# Patient Record
Sex: MALE | Race: WHITE | NOT HISPANIC OR LATINO
[De-identification: names, ages, dates, MRNs, and addresses within clinical notes are randomized per-mention and may not be internally consistent; named-entity substitution may affect disease eponyms.]

---

## 2017-04-03 ENCOUNTER — APPOINTMENT (OUTPATIENT)
Dept: ORTHOPEDIC SURGERY | Facility: CLINIC | Age: 22
End: 2017-04-03

## 2017-04-03 VITALS
SYSTOLIC BLOOD PRESSURE: 108 MMHG | DIASTOLIC BLOOD PRESSURE: 72 MMHG | HEIGHT: 76 IN | HEART RATE: 69 BPM | BODY MASS INDEX: 28.01 KG/M2 | WEIGHT: 230 LBS

## 2017-04-06 ENCOUNTER — FORM ENCOUNTER (OUTPATIENT)
Age: 22
End: 2017-04-06

## 2017-04-07 ENCOUNTER — OUTPATIENT (OUTPATIENT)
Dept: OUTPATIENT SERVICES | Facility: HOSPITAL | Age: 22
LOS: 1 days | End: 2017-04-07
Payer: SELF-PAY

## 2017-04-07 ENCOUNTER — APPOINTMENT (OUTPATIENT)
Dept: MRI IMAGING | Facility: CLINIC | Age: 22
End: 2017-04-07

## 2017-04-07 DIAGNOSIS — Z00.8 ENCOUNTER FOR OTHER GENERAL EXAMINATION: ICD-10-CM

## 2017-04-07 DIAGNOSIS — M25.512 PAIN IN LEFT SHOULDER: ICD-10-CM

## 2017-04-07 PROCEDURE — 73221 MRI JOINT UPR EXTREM W/O DYE: CPT

## 2017-04-07 PROCEDURE — 73221 MRI JOINT UPR EXTREM W/O DYE: CPT | Mod: 26,LT

## 2017-04-12 ENCOUNTER — APPOINTMENT (OUTPATIENT)
Dept: ORTHOPEDIC SURGERY | Facility: CLINIC | Age: 22
End: 2017-04-12
Payer: OTHER GOVERNMENT

## 2017-04-12 VITALS
HEIGHT: 76 IN | SYSTOLIC BLOOD PRESSURE: 118 MMHG | HEART RATE: 66 BPM | WEIGHT: 235 LBS | DIASTOLIC BLOOD PRESSURE: 80 MMHG | BODY MASS INDEX: 28.62 KG/M2

## 2017-04-12 DIAGNOSIS — M25.512 PAIN IN LEFT SHOULDER: ICD-10-CM

## 2017-04-12 PROCEDURE — 99213 OFFICE O/P EST LOW 20 MIN: CPT

## 2017-04-19 ENCOUNTER — EMERGENCY (EMERGENCY)
Facility: HOSPITAL | Age: 22
LOS: 1 days | Discharge: HOME | End: 2017-04-19
Attending: PEDIATRICS | Admitting: EMERGENCY MEDICINE
Payer: COMMERCIAL

## 2017-04-19 VITALS
RESPIRATION RATE: 20 BRPM | OXYGEN SATURATION: 98 % | SYSTOLIC BLOOD PRESSURE: 120 MMHG | HEART RATE: 72 BPM | DIASTOLIC BLOOD PRESSURE: 90 MMHG

## 2017-04-19 DIAGNOSIS — R10.13 EPIGASTRIC PAIN: ICD-10-CM

## 2017-04-19 LAB
ALBUMIN SERPL ELPH-MCNC: 4.8 G/DL — SIGNIFICANT CHANGE UP (ref 3.3–5)
ALP SERPL-CCNC: 67 U/L — SIGNIFICANT CHANGE UP (ref 40–120)
ALT FLD-CCNC: 63 U/L RC — HIGH (ref 10–45)
ANION GAP SERPL CALC-SCNC: 11 MMOL/L — SIGNIFICANT CHANGE UP (ref 5–17)
AST SERPL-CCNC: 64 U/L — HIGH (ref 10–40)
BASOPHILS # BLD AUTO: 0 K/UL — SIGNIFICANT CHANGE UP (ref 0–0.2)
BASOPHILS NFR BLD AUTO: 0.2 % — SIGNIFICANT CHANGE UP (ref 0–2)
BILIRUB SERPL-MCNC: 1.1 MG/DL — SIGNIFICANT CHANGE UP (ref 0.2–1.2)
BUN SERPL-MCNC: 20 MG/DL — SIGNIFICANT CHANGE UP (ref 7–23)
CALCIUM SERPL-MCNC: 9.4 MG/DL — SIGNIFICANT CHANGE UP (ref 8.4–10.5)
CHLORIDE SERPL-SCNC: 101 MMOL/L — SIGNIFICANT CHANGE UP (ref 96–108)
CO2 SERPL-SCNC: 27 MMOL/L — SIGNIFICANT CHANGE UP (ref 22–31)
CREAT SERPL-MCNC: 1.01 MG/DL — SIGNIFICANT CHANGE UP (ref 0.5–1.3)
EOSINOPHIL # BLD AUTO: 0.2 K/UL — SIGNIFICANT CHANGE UP (ref 0–0.5)
EOSINOPHIL NFR BLD AUTO: 1.8 % — SIGNIFICANT CHANGE UP (ref 0–6)
GAS PNL BLDV: SIGNIFICANT CHANGE UP
GLUCOSE SERPL-MCNC: 107 MG/DL — HIGH (ref 70–99)
HCT VFR BLD CALC: 43.6 % — SIGNIFICANT CHANGE UP (ref 39–50)
HGB BLD-MCNC: 14.9 G/DL — SIGNIFICANT CHANGE UP (ref 13–17)
LIDOCAIN IGE QN: 23 U/L — SIGNIFICANT CHANGE UP (ref 7–60)
LYMPHOCYTES # BLD AUTO: 0.9 K/UL — LOW (ref 1–3.3)
LYMPHOCYTES # BLD AUTO: 9.5 % — LOW (ref 13–44)
MCHC RBC-ENTMCNC: 31 PG — SIGNIFICANT CHANGE UP (ref 27–34)
MCHC RBC-ENTMCNC: 34.1 GM/DL — SIGNIFICANT CHANGE UP (ref 32–36)
MCV RBC AUTO: 90.9 FL — SIGNIFICANT CHANGE UP (ref 80–100)
MONOCYTES # BLD AUTO: 0.6 K/UL — SIGNIFICANT CHANGE UP (ref 0–0.9)
MONOCYTES NFR BLD AUTO: 7 % — SIGNIFICANT CHANGE UP (ref 2–14)
NEUTROPHILS # BLD AUTO: 7.3 K/UL — SIGNIFICANT CHANGE UP (ref 1.8–7.4)
NEUTROPHILS NFR BLD AUTO: 81.5 % — HIGH (ref 43–77)
PLATELET # BLD AUTO: 218 K/UL — SIGNIFICANT CHANGE UP (ref 150–400)
POTASSIUM SERPL-MCNC: 3.9 MMOL/L — SIGNIFICANT CHANGE UP (ref 3.5–5.3)
POTASSIUM SERPL-SCNC: 3.9 MMOL/L — SIGNIFICANT CHANGE UP (ref 3.5–5.3)
PROT SERPL-MCNC: 7.6 G/DL — SIGNIFICANT CHANGE UP (ref 6–8.3)
RBC # BLD: 4.8 M/UL — SIGNIFICANT CHANGE UP (ref 4.2–5.8)
RBC # FLD: 11.6 % — SIGNIFICANT CHANGE UP (ref 10.3–14.5)
SODIUM SERPL-SCNC: 139 MMOL/L — SIGNIFICANT CHANGE UP (ref 135–145)
WBC # BLD: 9 K/UL — SIGNIFICANT CHANGE UP (ref 3.8–10.5)
WBC # FLD AUTO: 9 K/UL — SIGNIFICANT CHANGE UP (ref 3.8–10.5)

## 2017-04-19 PROCEDURE — 96374 THER/PROPH/DIAG INJ IV PUSH: CPT

## 2017-04-19 PROCEDURE — 76705 ECHO EXAM OF ABDOMEN: CPT

## 2017-04-19 PROCEDURE — 82435 ASSAY OF BLOOD CHLORIDE: CPT

## 2017-04-19 PROCEDURE — 82803 BLOOD GASES ANY COMBINATION: CPT

## 2017-04-19 PROCEDURE — 85014 HEMATOCRIT: CPT

## 2017-04-19 PROCEDURE — 83605 ASSAY OF LACTIC ACID: CPT

## 2017-04-19 PROCEDURE — 84132 ASSAY OF SERUM POTASSIUM: CPT

## 2017-04-19 PROCEDURE — 80053 COMPREHEN METABOLIC PANEL: CPT

## 2017-04-19 PROCEDURE — 85027 COMPLETE CBC AUTOMATED: CPT

## 2017-04-19 PROCEDURE — 96375 TX/PRO/DX INJ NEW DRUG ADDON: CPT

## 2017-04-19 PROCEDURE — 99284 EMERGENCY DEPT VISIT MOD MDM: CPT | Mod: 25

## 2017-04-19 PROCEDURE — 82330 ASSAY OF CALCIUM: CPT

## 2017-04-19 PROCEDURE — 84295 ASSAY OF SERUM SODIUM: CPT

## 2017-04-19 PROCEDURE — 99285 EMERGENCY DEPT VISIT HI MDM: CPT

## 2017-04-19 PROCEDURE — 82947 ASSAY GLUCOSE BLOOD QUANT: CPT

## 2017-04-19 PROCEDURE — 83690 ASSAY OF LIPASE: CPT

## 2017-04-19 RX ORDER — SODIUM CHLORIDE 9 MG/ML
2000 INJECTION INTRAMUSCULAR; INTRAVENOUS; SUBCUTANEOUS ONCE
Qty: 0 | Refills: 0 | Status: COMPLETED | OUTPATIENT
Start: 2017-04-19 | End: 2017-04-19

## 2017-04-19 RX ORDER — ONDANSETRON 8 MG/1
4 TABLET, FILM COATED ORAL ONCE
Qty: 0 | Refills: 0 | Status: COMPLETED | OUTPATIENT
Start: 2017-04-19 | End: 2017-04-19

## 2017-04-19 RX ORDER — ACETAMINOPHEN 500 MG
1000 TABLET ORAL ONCE
Qty: 0 | Refills: 0 | Status: COMPLETED | OUTPATIENT
Start: 2017-04-19 | End: 2017-04-19

## 2017-04-19 RX ORDER — FAMOTIDINE 10 MG/ML
20 INJECTION INTRAVENOUS ONCE
Qty: 0 | Refills: 0 | Status: COMPLETED | OUTPATIENT
Start: 2017-04-19 | End: 2017-04-19

## 2017-04-19 RX ADMIN — ONDANSETRON 4 MILLIGRAM(S): 8 TABLET, FILM COATED ORAL at 21:42

## 2017-04-19 RX ADMIN — Medication 400 MILLIGRAM(S): at 23:54

## 2017-04-19 RX ADMIN — FAMOTIDINE 20 MILLIGRAM(S): 10 INJECTION INTRAVENOUS at 21:42

## 2017-04-19 RX ADMIN — Medication 30 MILLILITER(S): at 21:42

## 2017-04-19 RX ADMIN — SODIUM CHLORIDE 1333.33 MILLILITER(S): 9 INJECTION INTRAMUSCULAR; INTRAVENOUS; SUBCUTANEOUS at 21:34

## 2017-04-19 NOTE — ED PROVIDER NOTE - SHIFT CHANGE DETAILS
Patient 21y M with abd pain and nausea. Labs wnl (slight elevation LFTs). Now endorsing RUQ pain, awaiting US. Reassess abd/po trial prior to dispo. - Shabnam Melton MD

## 2017-04-19 NOTE — ED PROVIDER NOTE - PROGRESS NOTE DETAILS
pt feels better pt felt better initially, now with some recurring pain, RUQ. Will obtain US GB. pain resolved, tolerating PO, stable for dc - advised on LFT results and need for rpt testing pain resolved, tolerating PO, stable for dc - advised on LFT results and need for rpt testing  Samy Saunders MD: The patient tolerated an oral "po" challenge.  The patient was re-examined after interventions and is feeling much better.  The patient will follow up with their primary physician this week.  No immediate life threatening issues present on history or clinical exam. Patient is a safe disposition home, family has capacity and insight into their condition, no futher questions in the emergency department and will follow up with their doctor(s) this week. The family understands anticipatory guidance and was given strict return and follow up precautions.  The family has been informed of all concerning signs and symptoms to return to Emergency Department, the necessity to follow up with PMD/Clinic/follow up provided within 2 days was explained, and the family reports understanding of above with capacity and insight.

## 2017-04-19 NOTE — ED ADULT NURSE NOTE - CHPI ED SYMPTOMS NEG
no hematuria/no fever/no diarrhea/no dysuria/no burning urination/no abdominal distension/no vomiting/no chills/no blood in stool

## 2017-04-19 NOTE — ED ADULT NURSE NOTE - OBJECTIVE STATEMENT
21 y.o male biba for nausea, dizziness and abd pain. Pt states it began this morning with decreased appetite. Denies chest pain, sob, v/d, fever or chills. A&Ox4, vss, skin warm dry and intact, lungs cta, abd soft nondistended, mid abd pain, maex4. Awaits further evaluation from md.

## 2017-04-19 NOTE — ED PROVIDER NOTE - ATTENDING CONTRIBUTION TO CARE
21y M with abd pain, nausea starting tonight. No emesis. SOme dizziness. No diarrhea. NO fever. No meds at home. No urinary symptoms. Otherwise healthy. [employer].  Vital Signs Stable  Gen: tired appearing  HEENT: no conjunctivitis, MMM  Neck supple  Cardiac: regular rate rhythm, normal S1S2  Chest: CTA BL, no wheeze or crackles  Abdomen: normal BS, soft, NT no RUQ tenderness or RLQ tenderness  Extremity: no gross deformity, good perfusion  Skin: no rash  Neuro: grossly normal     AP 21y M with abd pain and nausea, no longer with abd pain but +nausea. Labs, fluids, zofran, GI cocktail, reassess.

## 2017-04-19 NOTE — ED PROVIDER NOTE - PSYCHIATRIC, MLM
Alert and oriented to person, place, time/situation. normal mood and affect. no apparent risk to self or others. normal mood and affect. no apparent risk to self or others.

## 2017-04-19 NOTE — ED PROVIDER NOTE - OBJECTIVE STATEMENT
20 y/o m w/o Hx pw abdominal pain.  pt notes onset epigastric AP associated w/ N/-V, lightheadedness.  Denies fever, CP, SOB, d/c.

## 2017-04-20 VITALS
DIASTOLIC BLOOD PRESSURE: 85 MMHG | SYSTOLIC BLOOD PRESSURE: 122 MMHG | RESPIRATION RATE: 20 BRPM | OXYGEN SATURATION: 100 % | TEMPERATURE: 99 F | HEART RATE: 84 BPM

## 2017-04-20 PROCEDURE — 76705 ECHO EXAM OF ABDOMEN: CPT | Mod: 26,RT

## 2017-08-31 ENCOUNTER — EMERGENCY (EMERGENCY)
Facility: HOSPITAL | Age: 22
LOS: 1 days | Discharge: HOME | End: 2017-08-31
Attending: EMERGENCY MEDICINE | Admitting: EMERGENCY MEDICINE
Payer: COMMERCIAL

## 2017-08-31 VITALS
RESPIRATION RATE: 16 BRPM | SYSTOLIC BLOOD PRESSURE: 113 MMHG | TEMPERATURE: 99 F | DIASTOLIC BLOOD PRESSURE: 79 MMHG | OXYGEN SATURATION: 99 % | HEART RATE: 71 BPM

## 2017-08-31 VITALS
SYSTOLIC BLOOD PRESSURE: 104 MMHG | TEMPERATURE: 98 F | RESPIRATION RATE: 16 BRPM | DIASTOLIC BLOOD PRESSURE: 67 MMHG | HEART RATE: 62 BPM | OXYGEN SATURATION: 98 %

## 2017-08-31 PROCEDURE — 99284 EMERGENCY DEPT VISIT MOD MDM: CPT

## 2017-08-31 PROCEDURE — 99283 EMERGENCY DEPT VISIT LOW MDM: CPT

## 2017-08-31 RX ORDER — ACETAMINOPHEN 500 MG
975 TABLET ORAL ONCE
Qty: 0 | Refills: 0 | Status: COMPLETED | OUTPATIENT
Start: 2017-08-31 | End: 2017-08-31

## 2017-08-31 RX ORDER — IBUPROFEN 200 MG
600 TABLET ORAL ONCE
Qty: 0 | Refills: 0 | Status: DISCONTINUED | OUTPATIENT
Start: 2017-08-31 | End: 2017-09-04

## 2017-08-31 RX ADMIN — Medication 975 MILLIGRAM(S): at 20:48

## 2017-08-31 NOTE — ED PROVIDER NOTE - ATTENDING CONTRIBUTION TO CARE
------------ATTENDING NOTE------------   22 yo M c/o accidental hit in head playing rugby, no LOC but stunned, c/o mild dull frontal headache, no confusion/AMS per EMS/co-students, no vomiting, CTL spines clinically cleared, overall well appearing, nml neuro exam, very low suspicion for ciTBI, continue to obs -->  - Magen Polanco MD   ----------------------------------------------------------------------------

## 2017-08-31 NOTE — ED PROVIDER NOTE - OBJECTIVE STATEMENT
20yo male no significant pmh p/w headache after sports injury. Was playing rugby and was hit in left side of head by another player's shoulder. No LOC, no nausea, no vomiting, no vision changes, + ambulating. NO neck or back pain. Complains of headache and light sensitivity. NO other injury

## 2017-08-31 NOTE — ED ADULT NURSE NOTE - OBJECTIVE STATEMENT
20 yo presents to the ED from rugby practice s/p head injury. pt reports that he got tackled onto the floor during practice. pt reports hitting head on teammates shoulder. pt denies LOC, N/V, confusion. pt reports sensitivity to light. pt reports HA 3/10, left side of head and posterior region of head. no abrasion noted. no vision changes. pt well appearing. VSS. gross neuro intact. plan of care discussed. safety and comfort measures maintained.

## 2017-08-31 NOTE — ED PROVIDER NOTE - MEDICAL DECISION MAKING DETAILS
22yo with head injury - no loc, no vomiting, does not meet [place] head CT criteria, neuro exam normal, pain control and reassess

## 2020-09-02 NOTE — ED PROVIDER NOTE - ENMT, MLM
Call from Imaging. MRI Knee Joint order needing to be changed to W/ WO Contrast. Adding new order.         Orders Placed This Encounter   • MRI KNEE JOINT W WO CONTRAST LEFT       
Airway patent, Nasal mucosa clear. Mouth with normal mucosa. Throat has no vesicles, no oropharyngeal exudates and uvula is midline.